# Patient Record
Sex: MALE | Race: WHITE | NOT HISPANIC OR LATINO | Employment: FULL TIME | URBAN - METROPOLITAN AREA
[De-identification: names, ages, dates, MRNs, and addresses within clinical notes are randomized per-mention and may not be internally consistent; named-entity substitution may affect disease eponyms.]

---

## 2017-01-26 ENCOUNTER — COMMUNICATION - HEALTHEAST (OUTPATIENT)
Dept: FAMILY MEDICINE | Facility: CLINIC | Age: 47
End: 2017-01-26

## 2017-04-18 ENCOUNTER — COMMUNICATION - HEALTHEAST (OUTPATIENT)
Dept: FAMILY MEDICINE | Facility: CLINIC | Age: 47
End: 2017-04-18

## 2017-05-24 ENCOUNTER — COMMUNICATION - HEALTHEAST (OUTPATIENT)
Dept: FAMILY MEDICINE | Facility: CLINIC | Age: 47
End: 2017-05-24

## 2017-05-24 DIAGNOSIS — N40.0 BPH (BENIGN PROSTATIC HYPERPLASIA): ICD-10-CM

## 2017-06-16 ENCOUNTER — RECORDS - HEALTHEAST (OUTPATIENT)
Dept: ADMINISTRATIVE | Facility: OTHER | Age: 47
End: 2017-06-16

## 2017-07-03 ENCOUNTER — OFFICE VISIT - HEALTHEAST (OUTPATIENT)
Dept: FAMILY MEDICINE | Facility: CLINIC | Age: 47
End: 2017-07-03

## 2017-07-03 DIAGNOSIS — Z48.02 VISIT FOR SUTURE REMOVAL: ICD-10-CM

## 2017-07-03 ASSESSMENT — MIFFLIN-ST. JEOR: SCORE: 1794.52

## 2017-07-10 ENCOUNTER — COMMUNICATION - HEALTHEAST (OUTPATIENT)
Dept: FAMILY MEDICINE | Facility: CLINIC | Age: 47
End: 2017-07-10

## 2017-07-10 DIAGNOSIS — G47.00 INSOMNIA: ICD-10-CM

## 2017-08-26 ENCOUNTER — COMMUNICATION - HEALTHEAST (OUTPATIENT)
Dept: FAMILY MEDICINE | Facility: CLINIC | Age: 47
End: 2017-08-26

## 2017-08-26 DIAGNOSIS — N40.0 BPH (BENIGN PROSTATIC HYPERPLASIA): ICD-10-CM

## 2017-09-28 ENCOUNTER — OFFICE VISIT - HEALTHEAST (OUTPATIENT)
Dept: FAMILY MEDICINE | Facility: CLINIC | Age: 47
End: 2017-09-28

## 2017-09-28 DIAGNOSIS — Z00.00 ROUTINE GENERAL MEDICAL EXAMINATION AT A HEALTH CARE FACILITY: ICD-10-CM

## 2017-09-28 DIAGNOSIS — N40.0 BPH (BENIGN PROSTATIC HYPERPLASIA): ICD-10-CM

## 2017-09-28 DIAGNOSIS — G47.00 INSOMNIA: ICD-10-CM

## 2017-09-28 DIAGNOSIS — Z00.00 PREVENTATIVE HEALTH CARE: ICD-10-CM

## 2017-09-28 LAB
CHOLEST SERPL-MCNC: 287 MG/DL
FASTING STATUS PATIENT QL REPORTED: YES
HDLC SERPL-MCNC: 54 MG/DL
LDLC SERPL CALC-MCNC: 197 MG/DL
TRIGL SERPL-MCNC: 178 MG/DL

## 2017-09-28 ASSESSMENT — MIFFLIN-ST. JEOR: SCORE: 1794.52

## 2017-12-17 ENCOUNTER — COMMUNICATION - HEALTHEAST (OUTPATIENT)
Dept: FAMILY MEDICINE | Facility: CLINIC | Age: 47
End: 2017-12-17

## 2017-12-28 ENCOUNTER — COMMUNICATION - HEALTHEAST (OUTPATIENT)
Dept: FAMILY MEDICINE | Facility: CLINIC | Age: 47
End: 2017-12-28

## 2017-12-28 DIAGNOSIS — G47.00 INSOMNIA: ICD-10-CM

## 2018-12-10 ENCOUNTER — COMMUNICATION - HEALTHEAST (OUTPATIENT)
Dept: FAMILY MEDICINE | Facility: CLINIC | Age: 48
End: 2018-12-10

## 2018-12-12 ENCOUNTER — COMMUNICATION - HEALTHEAST (OUTPATIENT)
Dept: FAMILY MEDICINE | Facility: CLINIC | Age: 48
End: 2018-12-12

## 2018-12-12 DIAGNOSIS — G47.00 INSOMNIA: ICD-10-CM

## 2019-08-28 ENCOUNTER — OFFICE VISIT - HEALTHEAST (OUTPATIENT)
Dept: FAMILY MEDICINE | Facility: CLINIC | Age: 49
End: 2019-08-28

## 2019-08-28 ENCOUNTER — COMMUNICATION - HEALTHEAST (OUTPATIENT)
Dept: FAMILY MEDICINE | Facility: CLINIC | Age: 49
End: 2019-08-28

## 2019-08-28 DIAGNOSIS — G47.00 INSOMNIA: ICD-10-CM

## 2019-08-28 DIAGNOSIS — E78.00 HYPERCHOLESTEREMIA: ICD-10-CM

## 2019-08-28 DIAGNOSIS — N40.0 BPH (BENIGN PROSTATIC HYPERPLASIA): ICD-10-CM

## 2019-08-28 DIAGNOSIS — F51.01 PRIMARY INSOMNIA: ICD-10-CM

## 2019-08-28 DIAGNOSIS — Z00.00 ROUTINE GENERAL MEDICAL EXAMINATION AT A HEALTH CARE FACILITY: ICD-10-CM

## 2019-08-28 DIAGNOSIS — N40.1 BENIGN PROSTATIC HYPERPLASIA WITH LOWER URINARY TRACT SYMPTOMS, SYMPTOM DETAILS UNSPECIFIED: ICD-10-CM

## 2019-08-28 LAB
ANION GAP SERPL CALCULATED.3IONS-SCNC: 9 MMOL/L (ref 5–18)
BUN SERPL-MCNC: 11 MG/DL (ref 8–22)
CALCIUM SERPL-MCNC: 9.7 MG/DL (ref 8.5–10.5)
CHLORIDE BLD-SCNC: 104 MMOL/L (ref 98–107)
CHOLEST SERPL-MCNC: 240 MG/DL
CO2 SERPL-SCNC: 26 MMOL/L (ref 22–31)
CREAT SERPL-MCNC: 1.02 MG/DL (ref 0.7–1.3)
FASTING STATUS PATIENT QL REPORTED: YES
GFR SERPL CREATININE-BSD FRML MDRD: >60 ML/MIN/1.73M2
GLUCOSE BLD-MCNC: 93 MG/DL (ref 70–125)
HDLC SERPL-MCNC: 46 MG/DL
LDLC SERPL CALC-MCNC: 147 MG/DL
POTASSIUM BLD-SCNC: 4.7 MMOL/L (ref 3.5–5)
SODIUM SERPL-SCNC: 139 MMOL/L (ref 136–145)
TRIGL SERPL-MCNC: 237 MG/DL

## 2019-08-28 RX ORDER — TAMSULOSIN HYDROCHLORIDE 0.4 MG/1
CAPSULE ORAL
Qty: 90 CAPSULE | Refills: 2 | Status: SHIPPED | OUTPATIENT
Start: 2019-08-28 | End: 2024-05-16

## 2019-08-28 ASSESSMENT — MIFFLIN-ST. JEOR: SCORE: 1768.73

## 2019-08-29 ENCOUNTER — COMMUNICATION - HEALTHEAST (OUTPATIENT)
Dept: FAMILY MEDICINE | Facility: CLINIC | Age: 49
End: 2019-08-29

## 2019-12-01 ENCOUNTER — COMMUNICATION - HEALTHEAST (OUTPATIENT)
Dept: FAMILY MEDICINE | Facility: CLINIC | Age: 49
End: 2019-12-01

## 2019-12-01 DIAGNOSIS — Z76.0 ENCOUNTER FOR MEDICATION REFILL: ICD-10-CM

## 2020-09-05 ENCOUNTER — COMMUNICATION - HEALTHEAST (OUTPATIENT)
Dept: FAMILY MEDICINE | Facility: CLINIC | Age: 50
End: 2020-09-05

## 2020-09-05 DIAGNOSIS — G47.00 INSOMNIA: ICD-10-CM

## 2020-09-08 RX ORDER — TRAZODONE HYDROCHLORIDE 100 MG/1
TABLET ORAL
Qty: 90 TABLET | Refills: 3 | Status: SHIPPED | OUTPATIENT
Start: 2020-09-08 | End: 2024-05-16

## 2020-12-17 ENCOUNTER — COMMUNICATION - HEALTHEAST (OUTPATIENT)
Dept: FAMILY MEDICINE | Facility: CLINIC | Age: 50
End: 2020-12-17

## 2020-12-17 DIAGNOSIS — Z76.0 ENCOUNTER FOR MEDICATION REFILL: ICD-10-CM

## 2020-12-18 RX ORDER — DUTASTERIDE 0.5 MG/1
0.5 CAPSULE, LIQUID FILLED ORAL DAILY
Qty: 90 CAPSULE | Refills: 3 | Status: SHIPPED | OUTPATIENT
Start: 2020-12-18 | End: 2024-05-16

## 2021-05-02 ENCOUNTER — HEALTH MAINTENANCE LETTER (OUTPATIENT)
Age: 51
End: 2021-05-02

## 2021-05-31 VITALS — HEIGHT: 72 IN | WEIGHT: 200.5 LBS | BODY MASS INDEX: 27.16 KG/M2

## 2021-05-31 VITALS — WEIGHT: 200.5 LBS | HEIGHT: 72 IN | BODY MASS INDEX: 27.16 KG/M2

## 2021-05-31 NOTE — TELEPHONE ENCOUNTER
----- Message from Anival Calix MD sent at 8/28/2019  4:39 PM CDT -----  please inform the patient that his cholesterol results show an elevated triglyceride level from last year.  It is now 237 we should recheck his triglycerides in approximately 3 months if they remain elevated I would start him on a statin.  His metabolic panel is normal please send a copy the results to his home thank you

## 2021-05-31 NOTE — TELEPHONE ENCOUNTER
Patient Returning Call  Reason for call:  Patient returning call  Information relayed to patient:                ----- Message from Anival Calix MD sent at 8/28/2019  4:39 PM CDT -----  please inform the patient that his cholesterol results show an elevated triglyceride level from last year.  It is now 237 we should recheck his triglycerides in approximately 3 months if they remain elevated I would start him on a statin.  His metabolic panel is normal please send a copy the results to his home thank you                  Patient has additional questions:  No  If YES, what are your questions/concerns:  None   Okay to leave a detailed message?: No call back needed

## 2021-06-01 ENCOUNTER — OFFICE VISIT - HEALTHEAST (OUTPATIENT)
Dept: FAMILY MEDICINE | Facility: CLINIC | Age: 51
End: 2021-06-01

## 2021-06-01 DIAGNOSIS — F51.01 PRIMARY INSOMNIA: ICD-10-CM
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
## 2021-06-01 DIAGNOSIS — S80.862A TICK BITE OF LEFT LOWER LEG, INITIAL ENCOUNTER: ICD-10-CM

## 2021-06-01 DIAGNOSIS — Z12.11 SPECIAL SCREENING FOR MALIGNANT NEOPLASMS, COLON: ICD-10-CM

## 2021-06-01 DIAGNOSIS — E78.00 HYPERCHOLESTEREMIA: ICD-10-CM

## 2021-06-01 DIAGNOSIS — Z13.220 LIPID SCREENING: ICD-10-CM

## 2021-06-01 LAB
ANION GAP SERPL CALCULATED.3IONS-SCNC: 10 MMOL/L (ref 5–18)
BUN SERPL-MCNC: 15 MG/DL (ref 8–22)
CALCIUM SERPL-MCNC: 9.6 MG/DL (ref 8.5–10.5)
CHLORIDE BLD-SCNC: 102 MMOL/L (ref 98–107)
CHOLEST SERPL-MCNC: 229 MG/DL
CO2 SERPL-SCNC: 25 MMOL/L (ref 22–31)
CREAT SERPL-MCNC: 0.95 MG/DL (ref 0.7–1.3)
FASTING STATUS PATIENT QL REPORTED: NO
GFR SERPL CREATININE-BSD FRML MDRD: >60 ML/MIN/1.73M2
GLUCOSE BLD-MCNC: 131 MG/DL (ref 70–125)
HDLC SERPL-MCNC: 48 MG/DL
LDLC SERPL CALC-MCNC: 153 MG/DL
POTASSIUM BLD-SCNC: 4.4 MMOL/L (ref 3.5–5)
SODIUM SERPL-SCNC: 137 MMOL/L (ref 136–145)
TRIGL SERPL-MCNC: 140 MG/DL

## 2021-06-01 ASSESSMENT — MIFFLIN-ST. JEOR: SCORE: 1768.16

## 2021-06-02 ENCOUNTER — COMMUNICATION - HEALTHEAST (OUTPATIENT)
Dept: ADMINISTRATIVE | Facility: CLINIC | Age: 51
End: 2021-06-02

## 2021-06-02 LAB — B BURGDOR IGG+IGM SER QL: 0.06 INDEX VALUE

## 2021-06-03 ENCOUNTER — COMMUNICATION - HEALTHEAST (OUTPATIENT)
Dept: SCHEDULING | Facility: CLINIC | Age: 51
End: 2021-06-03

## 2021-06-03 VITALS — WEIGHT: 196.56 LBS | HEIGHT: 71 IN | BODY MASS INDEX: 27.52 KG/M2

## 2021-06-03 NOTE — TELEPHONE ENCOUNTER
RN cannot approve Refill Request    RN can NOT refill this medication med is not covered by policy/route to provider     . Last office visit: Visit date not found Last Physical: Visit date not found Last MTM visit: Visit date not found Last visit same specialty: 7/3/2017 Netta Lopez MD.  Next visit within 3 mo: Visit date not found  Next physical within 3 mo: Visit date not found      Lizzie Garrido, [facility] [place] Connection Triage/Med Refill 12/1/2019    Requested Prescriptions   Pending Prescriptions Disp Refills     dutasteride (AVODART) 0.5 mg capsule [Pharmacy Med Name: DUTASTERIDE 0.5 MG CAPSULE] 90 capsule 3     Sig: TAKE 1 CAPSULE (0.5 MG TOTAL) BY MOUTH DAILY.       There is no refill protocol information for this order

## 2021-06-10 LAB — COLOGUARD-ABSTRACT: NEGATIVE

## 2021-06-11 NOTE — TELEPHONE ENCOUNTER
RN cannot approve Refill Request    RN can NOT refill this medication PCP messaged that patient is overdue for Office Visit. Last office visit: Visit date not found Last Physical: 8/28/2019 Last MTM visit: Visit date not found Last visit same specialty: 7/3/2017 Netta Lopez MD.  Next visit within 3 mo: Visit date not found  Next physical within 3 mo: Visit date not found      Isabela mSart, [facility] Triage/Med Refill 9/7/2020    Requested Prescriptions   Pending Prescriptions Disp Refills     traZODone (DESYREL) 100 MG tablet [Pharmacy Med Name: TRAZODONE 100 MG TABLET] 90 tablet 3     Sig: TAKE 1 TABLET BY MOUTH EVERYDAY AT BEDTIME       Tricyclics/Misc Antidepressant/Antianxiety Meds Refill Protocol Failed - 9/5/2020 12:18 AM        Failed - PCP or prescribing provider visit in last year     Last office visit with prescriber/PCP: Visit date not found OR same dept: Visit date not found OR same specialty: 7/3/2017 Netta Lopez MD  Last physical: 8/28/2019 Last MTM visit: Visit date not found   Next visit within 3 mo: Visit date not found  Next physical within 3 mo: Visit date not found  Prescriber OR PCP: Anival Calix MD  Last diagnosis associated with med order: 1. Insomnia  - traZODone (DESYREL) 100 MG tablet [Pharmacy Med Name: TRAZODONE 100 MG TABLET]; TAKE 1 TABLET BY MOUTH EVERYDAY AT BEDTIME  Dispense: 90 tablet; Refill: 3    If protocol passes may refill for 12 months if within 3 months of last provider visit (or a total of 15 months).

## 2021-06-11 NOTE — PROGRESS NOTES
"S:  47-year-old male who comes in today for suture removal.  He had stitches placed in his left hand after he cut himself using an instrument in his garden.  He was seen in the emergency room.  His tetanus was updated at that time.  This was 10 days ago.  He did receive sutures at [facility].  He has been doing well since.  He has been using antibacterial cream.  O:  /72 (Patient Site: Left Arm, Patient Position: Sitting, Cuff Size: Adult Regular)  Pulse 64  Temp 97.8  F (36.6  C) (Oral)   Resp 16  Ht 5' 11.5\" (1.816 m)  Wt 200 lb 8 oz (90.9 kg)  BMI 27.57 kg/m2  General he is in no acute distress alert.  Sutures were noted over the thenar eminence of his left hand.  These were removed today and Steri-Strips are placed in the usual fashion.  He tolerated the procedure well.  There were no complications.    Patient Active Problem List   Diagnosis     Tendonitis     Insomnia     Stress due to marital problems     Current Outpatient Prescriptions on File Prior to Visit   Medication Sig Dispense Refill     tamsulosin (FLOMAX) 0.4 mg Cp24 TAKE 1 CAPSULE (0.4 MG TOTAL) BY MOUTH DAILY. 90 capsule 0     traZODone (DESYREL) 100 MG tablet Take 1 tablet (100 mg total) by mouth bedtime. 90 tablet 3     melatonin 10 mg TbER Take by mouth bedtime.       temazepam (RESTORIL) 7.5 MG capsule Take 7.5 mg by mouth bedtime as needed.       No current facility-administered medications on file prior to visit.           No results found for this or any previous visit (from the past 48 hour(s)).      Assessment/Plan:  1. Visit for suture removal  He was instructed in wound care.  He will follow-up as needed.          Netta Lopez   7/3/2017 5:19 PM         "

## 2021-06-13 NOTE — PROGRESS NOTES
ASSESSMENT:  1. Preventative health care  Influenza, Seasonal Quad, Preservative Free 36+ Months    Lipid Profile    Basic Metabolic Panel   2. Insomnia sleep continues to be an issue he has been using the trazodone daily.  He gets some side effects and allows him to sleep for about 4 hours from broken sleep and he has to urinate.  We will first try to deal with his prostate issues and if that does not resolve his sleep issues would recommend using trazodone on trying another agent probable anxiolytic use warranted traZODone (DESYREL) 100 MG tablet   3. BPH (benign prostatic hyperplasia) prostate exam is unchanged from his last visit starting avodart  ,   flomax to continue tamsulosin (FLOMAX) 0.4 mg Cp24   4. Routine general medical examination at a health care facility all lab test discussed with patient.        PLAN:  There are no Patient Instructions on file for this visit.    Orders Placed This Encounter   Procedures     Influenza, Seasonal Quad, Preservative Free 36+ Months      Medications Discontinued During This Encounter   Medication Reason     melatonin 10 mg TbER Therapy completed     temazepam (RESTORIL) 7.5 MG capsule Therapy completed       No Follow-up on file.    CHIEF COMPLAINT:  Chief Complaint   Patient presents with     Annual Exam     fasting for labs       HISTORY OF PRESENT ILLNESS:  Srini is a 47 y.o. male presenting to the clinic today for a physical exam.    Insomnia: He sleeps about 6 hours of quality sleep a night. His sleep quality worsened with his job promotion. He notes that he will wake up in the middle of the night to urinate and not be able to sleep well afterwards. He is currently using 100 mg of trazodone. He often feels groggy when he wakes up.  He is not sure if he snores.    BPH: He notes that he wakes up 2-3 times a night to urinate.     Health Maintenance: He recently had a formal eye exam.     REVIEW OF SYSTEMS:   He denies shortness of breath.   All other 10 point review  "of systems are negative.    PFSH:  He has 2 children ages 8& 6. He is exercising 3 times a week. Pertinent past, family, social and medical history reviewed.   History   Smoking Status     Never Smoker   Smokeless Tobacco     Never Used       Family History   Problem Relation Age of Onset     Heart attack Father      Hyperlipidemia Mother      Hyperlipidemia Sister        Social History     Social History     Marital status: Single     Spouse name: N/A     Number of children: N/A     Years of education: N/A     Occupational History     Not on file.     Social History Main Topics     Smoking status: Never Smoker     Smokeless tobacco: Never Used     Alcohol use Yes      Comment: socially     Drug use: No     Sexual activity: No     Other Topics Concern     Not on file     Social History Narrative       No past surgical history on file.    Allergies   Allergen Reactions     Penicillins        Active Ambulatory Problems     Diagnosis Date Noted     Tendonitis      Insomnia      Stress due to marital problems 02/24/2015     Resolved Ambulatory Problems     Diagnosis Date Noted     No Resolved Ambulatory Problems     No Additional Past Medical History       VITALS:  Vitals:    09/28/17 0811   BP: 118/82   Patient Site: Right Arm   Patient Position: Sitting   Cuff Size: Adult Large   Pulse: (!) 55   Temp: 97.5  F (36.4  C)   TempSrc: Oral   SpO2: 98%   Weight: 200 lb 8 oz (90.9 kg)   Height: 5' 11.5\" (1.816 m)     Wt Readings from Last 3 Encounters:   09/28/17 200 lb 8 oz (90.9 kg)   07/03/17 200 lb 8 oz (90.9 kg)   09/15/16 190 lb 6 oz (86.4 kg)     Body mass index is 27.57 kg/(m^2).    PHYSICAL EXAM:  General: Alert, cooperative, no distress, appears stated age  Head: Normocephalic, without obvious abnormality, atraumatic  Eyes: PERRL, conjunctiva/cornea clear, EOM's intact, fundi benign, both eyes  Ears: Normal TM's and external ear canals, both ears  Nose: Nares normal, septum midline, mucosa normal, no drainage or " sinus tenderness  Throat: Lips, mucosa, and tongue normal; teeth and gums normal  Musculoskeletal: Back symmetric, no curvature, ROM normal, no CVA tenderness.  Lungs: Clear to auscultation bilaterally, respirations unlabored  Chest wall: No tenderness or deformity  Heart: Regular rate and rhythm, S1 and S2 normal, no murmur, rub, or gallop  CVS: No edema noted.   Abdomen: Soft, non tender, bowel sounds active all four quadrants, no masses, no organomegaly.  : (Male) no penile lesions or discharge, no testicular masses or tenderness, no hernias  Rectal: Mildly enlarged prostate size of apricot   Neurologic: No tremor, no focal findings.   DTRs are normal and symmetric both proximally and distally BUE and BLE.  Strength testing is normal and symetric both proximally and distally BUE and BLE.  Toes downgoing bilaterally.  Normal gait.   Psych: Oriented x3. Affect normal.     ADDITIONAL HISTORY SUMMARIZED (2): Reviewed Dr. Lopez's note from 7/3/2017 regarding suture removal.   DECISION TO OBTAIN EXTRA INFORMATION (1): None.   RADIOLOGY TESTS (1): None.  LABS (1): Labs ordered.   MEDICINE TESTS (1): None.  INDEPENDENT REVIEW (2 each): None.     The visit lasted a total of 25 minutes face to face with the patient. Over 50% of the time was spent counseling and educating the patient about physical exam.     IKirit, am scribing for and in the presence of, Dr. Calix.    Iksenia, personally performed the services described in this documentation, as scribed by Kirit Tracey in my presence, and it is both accurate and complete.    MEDICATIONS:  Current Outpatient Prescriptions   Medication Sig Dispense Refill     tamsulosin (FLOMAX) 0.4 mg Cp24 TAKE 1 CAPSULE (0.4 MG TOTAL) BY MOUTH DAILY. 90 capsule 2     traZODone (DESYREL) 100 MG tablet Take 1 tablet (100 mg total) by mouth at bedtime. Please make an appointment with MD before more refills needed. 90 tablet 0     No current facility-administered  medications for this visit.        Total data points:3

## 2021-06-13 NOTE — TELEPHONE ENCOUNTER
RN cannot approve Refill Request    RN can NOT refill this medication med is not covered by policy/route to provider. Last office visit: Visit date not found Last Physical: Visit date not found Last MTM visit: Visit date not found Last visit same specialty: 7/3/2017 Netta Lopez MD.  Next visit within 3 mo: Visit date not found  Next physical within 3 mo: Visit date not found      Deb Corcoran, [facility] Triage/Med Refill 12/17/2020    Requested Prescriptions   Pending Prescriptions Disp Refills     dutasteride (AVODART) 0.5 mg capsule [Pharmacy Med Name: DUTASTERIDE 0.5 MG CAPSULE] 90 capsule 3     Sig: TAKE 1 CAPSULE (0.5 MG TOTAL) BY MOUTH DAILY.       There is no refill protocol information for this order

## 2021-06-16 PROBLEM — E78.00 HYPERCHOLESTEREMIA: Status: ACTIVE | Noted: 2019-08-28

## 2021-06-16 PROBLEM — N40.1 BENIGN PROSTATIC HYPERPLASIA WITH LOWER URINARY TRACT SYMPTOMS: Status: ACTIVE | Noted: 2019-08-28

## 2021-06-25 NOTE — TELEPHONE ENCOUNTER
Reason for Call:  Request for results:    Name of test or procedure: All labs    Date of test of procedure: 6/1/2021    Location of the test or procedure: [facility]    OK to leave the result message on voice mail or with a family member? Yes    Phone number Patient can be reached at: Cell number on file:    Telephone Information:   Mobile [phone]       Additional comments: Pt received results via [employer] and needs interpretation of results. Please review and contact pt.     Call taken on 6/2/2021 at 2:48 PM by Ted Bhatia

## 2021-06-25 NOTE — PROGRESS NOTES
ASSESSMENT:  1. Tick bite of left lower leg, initial encounter  Presumptive treatment for isolated rash on left leg where he removed a nonengorged tick.  Side effects of medication discussed  - Lyme Antibody Cascade  - doxycycline (VIBRAMYCIN) 100 MG capsule; Take 1 capsule (100 mg total) by mouth 2 (two) times a day for 1 day.  Dispense: 2 capsule; Refill: 0  - Basic Metabolic Panel    2. Special screening for malignant neoplasms, colon    Patient agrees for Cologuard test.  - Cologuard    3. Primary insomnia    Takes trazodone as needed taking one fourth of a tablet no refills needed at this time  - Basic Metabolic Panel    4. Hypercholesteremia    Previous lipid results discussed will check lipid panel today    5. Lipid screening      - Lipid Cascade RANDOM    There are no Patient Instructions on file for this visit.    Orders Placed This Encounter   Procedures     Lyme Antibody [place]     Cologuard     Lipid Cascade RANDOM     Order Specific Question:   Fasting is required?     Answer:   No     Basic Metabolic Panel     There are no discontinued medications.    No follow-ups on file.    CHIEF COMPLAINT:  Chief Complaint   Patient presents with     tick bite     left inner thigh x 2 weeks ago       HISTORY OF PRESENT ILLNESS:  Srini is a 51 y.o. male who is here because he removed a tick from his left leg about 2 weeks ago.  He noted no pain when he removed it and it was not engorged his skin yesterday noticed a large bull's-eye-like rash on his left lower leg that was very itchy and tender.  He notes no recent fatigue he has been active.  He states he has been sleeping well.  Stress levels are low.  He is wondering if he needs to be checked or tested for Lyme's and be treated.    REVIEW OF SYSTEMS:     12 point review of  All other systems are negative.    PFSH:    Social history reviewed    TOBACCO USE:  Social History     Tobacco Use   Smoking Status Never Smoker   Smokeless Tobacco Never Used  "      VITALS:  Vitals:    06/01/21 0908   BP: 126/78   Pulse: 64   Resp: 18   Temp: 98.6  F (37  C)   TempSrc: Oral   Weight: 196 lb 7 oz (89.1 kg)   Height: 5' 11\" (1.803 m)     Wt Readings from Last 3 Encounters:   06/01/21 196 lb 7 oz (89.1 kg)   08/28/19 196 lb 9 oz (89.2 kg)   09/28/17 200 lb 8 oz (90.9 kg)       PHYSICAL EXAM:    Interactive well-built male sitting comfortably exam room no acute distress  HEENT neck supple mucous members moist no lymph enlargement and the neck  Respiratory system clear to auscultation good breath sounds no wheeze no crackles  CVS regular rate and rhythm no murmurs rubs gallops appreciated  Abdomen soft there is no focal tenderness bowel sounds are present  Skin well demarcated flat circular rash present on left thigh measuring approximately 4 x 3 cm in size it is nontender.  There is no localized areas of identification or scratch    DATA REVIEWED:  Additional History from Old Records Summarized (2): 0  Decision to Obtain Records (1): 0  Radiology Tests Summarized or Ordered (1): 0  Labs Reviewed or Ordered (1): 0  Medicine Test Summarized or Ordered (1): 0  Independent Review of EKG or X-RAY(2 each): 0    The visit lasted a total of 30 minutes .    MEDICATIONS:  Current Outpatient Medications   Medication Sig Dispense Refill     traZODone (DESYREL) 100 MG tablet TAKE 1 TABLET BY MOUTH EVERYDAY AT BEDTIME 90 tablet 3     doxycycline (VIBRAMYCIN) 100 MG capsule Take 1 capsule (100 mg total) by mouth 2 (two) times a day for 1 day. 2 capsule 0     dutasteride (AVODART) 0.5 mg capsule TAKE 1 CAPSULE (0.5 MG TOTAL) BY MOUTH DAILY. 90 capsule 3     tamsulosin (FLOMAX) 0.4 mg cap TAKE 1 CAPSULE (0.4 MG TOTAL) BY MOUTH DAILY. 90 capsule 2     No current facility-administered medications for this visit.        "

## 2021-06-25 NOTE — TELEPHONE ENCOUNTER
Went in to [facility] for he thought was a tick bite.    Rash was around tick bite.( Large rash)  He was given Lymes disease test, and   Also given 1 dose of Doxycycline.    Patient states since the test it seems to be getting better, but still itches and is still big.    Patient states rash is 12 inches long , and 4 inches wide.  It is the size of an adult palm.of hand.    He is asking for the MD who saw him to call him and let him know what it could be, and he wonders if he needs antibiotics, or   Some other medication for this.    He was advised to take benadryl.    Please advised   [phone]  Swetha Best RN  [facility] Triage/refill nurse    Reason for Disposition    Rash is only on 1 part of the body (localized)    Protocols used: RASH - WIDESPREAD ON DRUGS-A-OH

## 2021-06-25 NOTE — TELEPHONE ENCOUNTER
Glucose is high at 131, Lyme is normal, LDL and Cholesteral are high, although Cholesteral is lower than it was.  What can be relayed to pt?  Thanks

## 2021-06-27 NOTE — PROGRESS NOTES
Progress Notes by Anival Calix MD at 8/28/2019  8:00 AM     Author: Anival Calix MD Service: -- Author Type: Physician    Filed: 8/28/2019  8:52 AM Encounter Date: 8/28/2019 Status: Signed    : Anival Calix MD (Physician)       MALE PREVENTATIVE EXAM    Assessment and Plan:       1. Routine general medical examination at a health care facility  Anticipatory guidance discussed with the patient today.  His medications will be refilled.  He is fasting we discussed his lipid panel in detail family history of hyperlipidemia is present.  - Basic Metabolic Panel  - Lipid Cascade  1. Routine general medical examination at a health care facility  Basic Metabolic Panel    Lipid Cascade   2. Hypercholesteremia rechecking cholesterol today we will recheck in 6 months numbers remain above 250.  Family history of cholesteremia is present    3. Primary insomnia refilling trazodone no side effects noted    4. Benign prostatic hyperplasia with lower urinary tract symptoms, symptom details unspecified       He is taking the Flomax still gets up once a night to urinate.      Next follow up:  No follow-ups on file.    Immunization Review  Adult Imm Review: No immunizations due today  Documented tobacco use.  Website and phone contact for [employer] given to patient in AVS. and Patient does not consume nicotine at this time    I discussed the following with the patient:   Adult Healthy Living: Importance of regular exercise  Getting adequate sleep    I have had an Advance Directives discussion with the patient.    Subjective:   Chief Complaint: Srini Berry is an 49 y.o. male here for a preventative health visit.     HPI:    Srini states that overall he has been feeling well. He has recently changed his diet to help monitor his cholesterol.He has stopped eating so many eggs, and beef. He works out three or four times a week but notes it is closer to four. He only notices shortness of breathe when working out but knows that is  normal. He does note that he still has ringing in his ears.     Sleep: He is still taking his Trazadone. He cuts the pill in half and takes one before he goes to sleep and then another when he wakes up in the night. He frequently wakes up with the urge to urinate even though he says he did not have liquids right before bed. He does not know if he snores. He has never experimented with going off his flomax.       ROS:   Denies problems swallowing  Denies sore throat  Denies shortness of breathe  Denies Chest Pain  Denies Chest pressure  Denies  Heart burn   Denies  Dysuria  Denies back pain  Denies wheezing     PFSH:  He is still working, on a the third year of a five year contract   His last eye exam was a year and a half ago   His kids are eight and ten and he spent the summer doing some traveling with them in [place]  His sister has very high cholesterol even with monitoring her diet   His dad had a history of heart diseases   His mom wears glasses but has no history of eye diseases      Healthy Habits  Are you taking a daily aspirin? No  Do you typically exercising at least 40 min, 3-4 times per week?  Yes  Do you usually eat at least 4 servings of fruit and vegetables a day, include whole grains and fiber and avoid regularly eating high fat foods? Yes  Have you had an eye exam in the past two years? Yes  Do you see a dentist twice per year? Yes  Do you have any concerns regarding sleep? YES    Safety Screen  If you own firearms, are they secured in a locked gun cabinet or with trigger locks? The patient does not own any firearms  Do you feel you are safe where you are living?: Yes (8/28/2019  7:58 AM)  Do you feel you are safe in your relationship(s)?: Yes (8/28/2019  7:58 AM)        Cancer Screening     Patient has no health maintenance due at this time          Patient Care Team:  Anival Calix MD as PCP - General        History     Reviewed By Date/Time Sections Reviewed    Hina Ballard MA 8/28/2019   "8:02 AM Tobacco    BallardHina, MA 8/28/2019  7:57 AM Tobacco            Objective:   Vital Signs:   Visit Vitals  /78 (Patient Site: Left Arm, Patient Position: Sitting, Cuff Size: Adult Large)   Pulse (!) 52   Temp 97.3  F (36.3  C) (Oral)   Resp 18   Ht 5' 11\" (1.803 m)   Wt 196 lb 9 oz (89.2 kg)   SpO2 98%   BMI 27.41 kg/m           PHYSICAL EXAM:  General: Alert, cooperative, no distress, appears stated age  Head: Normocephalic, without obvious abnormality, atraumatic  Eyes: PERRL, conjunctiva/cornea clear, EOM's intact, fundi benign, both eyes  Ears: Normal TM's and external ear canals. Minor cerumen in left ear.  Nose: Nares normal, septum midline, mucosa normal, no drainage or sinus tenderness  Throat: Lips, mucosa, and tongue normal; teeth and gums normal  Back: Symmetric, no curvature, ROM normal, no CVA tenderness  Lungs: Clear to auscultation bilaterally, respirations unlabored  Chest wall: No tenderness or deformity  Heart: Regular rate and rhythm, S1 and S2 normal, no murmur, rub, or gallop  Abdomen: Soft, non tender, bowel sounds active all four quadrants, no masses, no organomegaly.   : (Male) no penile lesions or discharge, no testicular masses or tenderness, no hernias  Neurologic:  A & O x 3.  No tremor, no focal findings.   DTRs are normal and symmetric both proximally and distally BUE and BLE.  Strength testing is normal and symetric both proximally and distally BUE and BLE.  Toes downgoing bilaterally.  Normal gait.       The 10-year ASCVD risk score (Anjana DC Jr., et al., 2013) is: 4.2%    Values used to calculate the score:      Age: 49 years      Sex: Male      Is Non- : No      Diabetic: No      Tobacco smoker: No      Systolic Blood Pressure: 118 mmHg      Is BP treated: No      HDL Cholesterol: 54 mg/dL      Total Cholesterol: 287 mg/dL         Medication List           Accurate as of 8/28/19  8:41 AM. If you have any questions, ask your nurse or doctor. "               CONTINUE taking these medications    dutasteride 0.5 mg capsule  Also known as:  AVODART  INSTRUCTIONS:  TAKE 1 CAPSULE (0.5 MG TOTAL) BY MOUTH DAILY.        tamsulosin 0.4 mg Cap  Also known as:  FLOMAX  INSTRUCTIONS:  TAKE 1 CAPSULE (0.4 MG TOTAL) BY MOUTH DAILY.        traZODone 100 MG tablet  Also known as:  DESYREL  INSTRUCTIONS:  TAKE 1 TABLET (100 MG TOTAL) BY MOUTH AT BEDTIME.               Additional Screenings Completed Today:     ADDITIONAL HISTORY SUMMARIZED (2): None.  DECISION TO OBTAIN EXTRA INFORMATION (1): None.   RADIOLOGY TESTS (1): None.  LABS (1): Labs ordered and reviewed.  MEDICINE TESTS (1): None.  INDEPENDENT REVIEW (2 each): None.       The visit lasted a total of 20 minutes face to face with the patient. Over 50% of the time was spent counseling and educating the patient about annual wellness.    IBaljinder, am scribing for and in the presence of, Dr. Sauer.    I, Dr. Anival sauer, personally performed the services described in this documentation, as scribed by Baljinder Simons in my presence, and it is both accurate and complete.    Total data points: 1

## 2021-07-06 VITALS
HEART RATE: 64 BPM | SYSTOLIC BLOOD PRESSURE: 126 MMHG | WEIGHT: 196.44 LBS | TEMPERATURE: 98.6 F | HEIGHT: 71 IN | BODY MASS INDEX: 27.5 KG/M2 | RESPIRATION RATE: 18 BRPM | DIASTOLIC BLOOD PRESSURE: 78 MMHG

## 2021-10-17 ENCOUNTER — HEALTH MAINTENANCE LETTER (OUTPATIENT)
Age: 51
End: 2021-10-17

## 2022-05-29 ENCOUNTER — HEALTH MAINTENANCE LETTER (OUTPATIENT)
Age: 52
End: 2022-05-29

## 2022-10-01 ENCOUNTER — HEALTH MAINTENANCE LETTER (OUTPATIENT)
Age: 52
End: 2022-10-01

## 2023-06-04 ENCOUNTER — HEALTH MAINTENANCE LETTER (OUTPATIENT)
Age: 53
End: 2023-06-04

## 2024-05-14 ENCOUNTER — HOSPITAL ENCOUNTER (EMERGENCY)
Facility: HOSPITAL | Age: 54
Discharge: HOME | End: 2024-05-14
Attending: EMERGENCY MEDICINE | Admitting: EMERGENCY MEDICINE
Payer: COMMERCIAL

## 2024-05-14 VITALS
DIASTOLIC BLOOD PRESSURE: 83 MMHG | BODY MASS INDEX: 28.14 KG/M2 | HEART RATE: 55 BPM | WEIGHT: 201 LBS | SYSTOLIC BLOOD PRESSURE: 148 MMHG | OXYGEN SATURATION: 97 % | RESPIRATION RATE: 20 BRPM | TEMPERATURE: 98.6 F | HEIGHT: 71 IN

## 2024-05-14 DIAGNOSIS — S46.209A INJURY OF TENDON OF BICEPS: Primary | ICD-10-CM

## 2024-05-14 PROCEDURE — 99282 EMERGENCY DEPT VISIT SF MDM: CPT

## 2024-05-14 ASSESSMENT — COLUMBIA-SUICIDE SEVERITY RATING SCALE - C-SSRS
1. IN THE PAST MONTH, HAVE YOU WISHED YOU WERE DEAD OR WISHED YOU COULD GO TO SLEEP AND NOT WAKE UP?: NO
6. HAVE YOU EVER DONE ANYTHING, STARTED TO DO ANYTHING, OR PREPARED TO DO ANYTHING TO END YOUR LIFE?: NO
2. HAVE YOU ACTUALLY HAD ANY THOUGHTS OF KILLING YOURSELF IN THE PAST MONTH?: NO

## 2024-05-14 NOTE — DISCHARGE INSTRUCTIONS
You were seen in the emergency department for an arm injury.  It does seem that you have injured your right distal biceps tendon.  You were provided with a sling here.  He can wear this when you are up and moving around to help keep you comfortable.     I provided you with the contact information for [facility].  You can call them today to schedule follow-up for repeat evaluation. Tell them that you were seen in the emergency department and have a distal biceps tendon injury.    You may take Tylenol and ibuprofen for the pain. You may take 600 mg of ibuprofen (Advil) every 6 hours and 1000 mg acetaminophen (Tylenol) every 6 hours for pain. Do not take more than 3200 mg of ibuprofen (Advil) in 24 hours. Do not take more than 4000 mg of acetaminophen (Tylenol) in 24 hours. You may take this much for 1-3 days, then only use as needed.     Ice the area of pain. 15 minutes on, 15 minutes off.

## 2024-05-14 NOTE — ED PROVIDER NOTES
Emergency Department Encounter   NAME: Srini Berry  AGE: 54 year old male  YOB: 1970  MRN: [id number]    PCP: Anival Calix  ED PROVIDER: Annita Wagner PA-C    Evaluation Date & Time:   No admission date for patient encounter.    CHIEF COMPLAINT:  Arm Pain      Impression and Plan   MDM: 54-year-old male with no pertinent past medical history presents for evaluation of right arm injury.  He was doing jujitsu when his right elbow was hyperextended.  He then had pain to the distal biceps tendon.  On arrival patient is slightly hypertensive, but otherwise vitally stable.  Afebrile.  On my exam patient is well-appearing.  He is able to flex the right bicep.  He has full shoulder and elbow range of motion.  Neurovascularly intact.  He does have point tenderness to the distal bicep tendon during bicep contraction. No palpable step off or obvious deformity of the bicep. No bony tenderness concerning for fracture. We discussed that he likely injured the distal bicep tendon. He was provided with a sling. We discussed calling orthopedics today to schedule a follow up for assessment which he is agreeable to. Reviewed OTC pain control, icing the area, and resting. We reviewed strict return precautions and patient was discharged home in stable condition.    I have staffed the patient with Dr. Garcia, ED physician, who will evaluate the patient and agrees with all aspects of today's care.          Medical Decision Making  Obtained supplemental history:Supplemental history obtained?: No  Reviewed external records: External records reviewed?: No  Care impacted by chronic illness:N/A  Care significantly affected by social determinants of health:N/A  Did you consider but not order tests?: Work up considered but not performed and documented in chart, if applicable  Did you interpret images independently?: My independent interpretation of imaging: none  Consultation discussion with other provider:Did you involve another  "provider (consultant, MH, pharmacy, etc.)?: No  Discharge. No recommendations on prescription strength medication(s). N/A.   At the conclusion of the encounter I discussed the results of all the tests and the disposition. The questions were answered. The patient or family acknowledged understanding and was agreeable with the care plan.    FINAL IMPRESSION:  No diagnosis found.      MEDICATIONS GIVEN IN THE EMERGENCY DEPARTMENT:  Medications - No data to display      NEW PRESCRIPTIONS STARTED AT TODAY'S ED VISIT:  New Prescriptions    No medications on file         HPI   Patient information was obtained from: patient   Use of Intrepreter: N/A     Srini Berry is a 54 year old male with no pertinent history who presents to the ED by car for evaluation of R arm pain. Was doing jujitsu today when his R elbow was hyperextended. No direct trauma. Since then he has pain at the distal bicep tendon when flexes his bicep. Able to range his elbow and shoulder well. No numbness or tingling.       REVIEW OF SYSTEMS:  Pertinent positive and negative symptoms per HPI.       Medical History     No past medical history on file.    No past surgical history on file.    Family History   Problem Relation Age of Onset    Coronary Artery Disease Father     Hyperlipidemia Mother     Hyperlipidemia Sister        Social History     Tobacco Use    Smoking status: Never    Smokeless tobacco: Never   Substance Use Topics    Alcohol use: Yes     Comment: Alcoholic Drinks/day: socially    Drug use: No       dutasteride (AVODART) 0.5 mg capsule  tamsulosin (FLOMAX) 0.4 mg cap  traZODone (DESYREL) 100 MG tablet          Physical Exam     First Vitals:  Patient Vitals for the past 24 hrs:   BP Temp Pulse Resp SpO2 Height Weight   05/14/24 1218 (!) 146/80 98.6  F (37  C) 55 20 96 % 1.803 m (5' 11\") 91.2 kg (201 lb)       PHYSICAL EXAM:   General Appearance:  Alert, cooperative, no distress, appears stated age  Musculoskeletal: Moving all " extremities. No gross deformities.   R arm: full elbow and shoulder ROM. Sensation intact to light touch. 2+ radial pulse. Able to flex bicep. Tenderness to distal bicep palpation only during bicep contraction. No palpable step off of the tendon. No elbow or shoulder bony tenderness to palpation.  Integument: Warm, dry, no rashes or lesions  Neurologic: Alert and orientated x3. No focal deficits.  Psych: Normal mood and affect      Results     LAB:  All pertinent labs reviewed and interpreted  Labs Ordered and Resulted from Time of ED Arrival to Time of ED Departure - No data to display    RADIOLOGY:  No orders to display         Annita Wagner PA-C   Emergency Medicine   [facility] EMERGENCY DEPARTMENT       Annita Wagner PA-C  05/14/24 [id number]

## 2024-05-14 NOTE — ED PROVIDER NOTES
I, Chrissy Garcia have reviewed the documentation, personally taken the patient's history, performed an exam and agree with the physical finds, diagnosis and management plan.    HPI:  53 y/o hyperexteded RUE at elbow today while doing jiujutsu. C/o pain along bicep. Hasn't taken anything for pain.      Physical Exam: No pain with range of motion at the shoulder, elbow, wrist.  Patient has point tenderness to palpation along the distal portion of the biceps as it inserts by the elbow and there does seem to be some slight asymmetry right versus left with some less fullness to the bicep distally.  However I do not feel any petra cut off and he is totally intact with 5 out of 5 strength.    MDM: Suspect a partial biceps tendon rupture.  Certainly there is not a complete rupture as he is intact.  With pain-free range of motion, does not warrant any imaging for radial head fracture or other associated fracture    ED Course/workup: Given given sling for comfort and outpatient Ortho follow-up             Final Diagnosis:     ICD-10-CM    1. Injury of tendon of biceps  S46.209A             I personally saw the patient and performed a substantive portion of the visit including all aspects of the medical decision making.  I personally made/approved the management plan and take responsibility for the patient management.     MD Jose Sanchez Zabrina N, MD  05/14/24 1305

## 2024-05-15 ENCOUNTER — TRANSFERRED RECORDS (OUTPATIENT)
Dept: HEALTH INFORMATION MANAGEMENT | Facility: CLINIC | Age: 54
End: 2024-05-15
Payer: COMMERCIAL

## 2024-05-16 ENCOUNTER — OFFICE VISIT (OUTPATIENT)
Dept: FAMILY MEDICINE | Facility: CLINIC | Age: 54
End: 2024-05-16
Payer: COMMERCIAL

## 2024-05-16 VITALS
SYSTOLIC BLOOD PRESSURE: 110 MMHG | WEIGHT: 203 LBS | HEIGHT: 71 IN | DIASTOLIC BLOOD PRESSURE: 68 MMHG | TEMPERATURE: 97.8 F | HEART RATE: 51 BPM | BODY MASS INDEX: 28.42 KG/M2 | OXYGEN SATURATION: 98 %

## 2024-05-16 DIAGNOSIS — Z12.11 SCREEN FOR COLON CANCER: ICD-10-CM

## 2024-05-16 DIAGNOSIS — E78.00 HYPERCHOLESTEREMIA: Primary | ICD-10-CM

## 2024-05-16 DIAGNOSIS — Z01.818 PREOP EXAMINATION: ICD-10-CM

## 2024-05-16 LAB
ERYTHROCYTE [DISTWIDTH] IN BLOOD BY AUTOMATED COUNT: 12.4 % (ref 10–15)
HCT VFR BLD AUTO: 42.6 % (ref 40–53)
HGB BLD-MCNC: 14.4 G/DL (ref 13.3–17.7)
MCH RBC QN AUTO: 31 PG (ref 26.5–33)
MCHC RBC AUTO-ENTMCNC: 33.8 G/DL (ref 31.5–36.5)
MCV RBC AUTO: 92 FL (ref 78–100)
PLATELET # BLD AUTO: 237 10E3/UL (ref 150–450)
RBC # BLD AUTO: 4.64 10E6/UL (ref 4.4–5.9)
WBC # BLD AUTO: 6.4 10E3/UL (ref 4–11)

## 2024-05-16 PROCEDURE — 85027 COMPLETE CBC AUTOMATED: CPT | Performed by: FAMILY MEDICINE

## 2024-05-16 PROCEDURE — 99214 OFFICE O/P EST MOD 30 MIN: CPT | Performed by: FAMILY MEDICINE

## 2024-05-16 PROCEDURE — 80048 BASIC METABOLIC PNL TOTAL CA: CPT | Performed by: FAMILY MEDICINE

## 2024-05-16 PROCEDURE — 36415 COLL VENOUS BLD VENIPUNCTURE: CPT | Performed by: FAMILY MEDICINE

## 2024-05-16 NOTE — PROGRESS NOTES
Preoperative Evaluation  [facility]  [address] [address]  Phone: [phone]  Fax: [phone]  Primary Provider: Anival Calix MD  Pre-op Performing Provider: Virgil Alva MD  May 16, 2024               5/16/2024   Surgical Information   What procedure is being done? Pre-op physical, R distal biceps injury repair   Facility or Hospital where procedure/surgery will be performed: [facility]   Who is doing the procedure / surgery? Mychal Blum M.D.   Date of surgery / procedure: 5/20/2024   Time of surgery / procedure: Morning   Where do you plan to recover after surgery? at home with family     Fax number for surgical facility: [phone]    Assessment & Plan     The proposed surgical procedure is considered INTERMEDIATE risk.    (Z01.818) Preop examination  Comment: For repair of a right distal biceps tendon injury  Plan: Basic metabolic panel, CBC with platelets             PLAN:  1.  CBC and basic metabolic profile reviewed, results normal, see below.    2.  Patient is cleared for surgery      Possible Sleep Apnea: No obvious symptoms of        Implanted Device         - No identified additional risk factors other than previously addressed    Antiplatelet or Anticoagulation Medication Instructions   - Patient is on no antiplatelet or anticoagulation medications.    Additional Medication Instructions  Patient is on no additional chronic medications    Recommendation  Approval given to proceed with proposed procedure, without further diagnostic evaluation.          Kip Milligan is a 54 year old, presenting for the following:  Pre-Op Exam          5/16/2024    10:45 AM   Additional Questions   Roomed by Jeanne DUARTE related to upcoming procedure: Patient comes in for preoperative clearance prior to repair of her right distal biceps tendon repair of note doing shirley he had a hyperextension of the right elbow sustained the injury as  above and is scheduled for surgical intervention.    Patient has had only 1 surgery when he was young he had a right inguinal repair.  The patient is otherwise healthy he is on no regular medications, he has no confirmed drug allergies he has never been a smoker he does not have a history of heart or lung disease.              5/16/2024   Pre-Op Questionnaire   Have you ever had a heart attack or stroke? No   Have you ever had surgery on your heart or blood vessels, such as a stent placement, a coronary artery bypass, or surgery on an artery in your head, neck, heart, or legs? No   Do you have chest pain with activity? No   Do you have a history of heart failure? No   Do you currently have a cold, bronchitis or symptoms of other infection? No   Do you have a cough, shortness of breath, or wheezing? No   Do you or anyone in your family have previous history of blood clots? No   Do you or does anyone in your family have a serious bleeding problem such as prolonged bleeding following surgeries or cuts? No   Have you ever had problems with anemia or been told to take iron pills? No   Have you had any abnormal blood loss such as black, tarry or bloody stools? No   Have you ever had a blood transfusion? No   Are you willing to have a blood transfusion if it is medically needed before, during, or after your surgery? Yes   Have you or any of your relatives ever had problems with anesthesia? No   Do you have sleep apnea, excessive snoring or daytime drowsiness? No   Do you have any artifical heart valves or other implanted medical devices like a pacemaker, defibrillator, or continuous glucose monitor? No   Do you have artificial joints? No   Are you allergic to latex? No     Health Care Directive  Patient does not have a Health Care Directive or Living Will: Advance Directive received and scanned. Click on Code in the patient header to view.    Preoperative Review of    reviewed - no record of controlled substances  "prescribed.        Status of Chronic Conditions:  See problem list for active medical problems.  Problems all longstanding and stable, except as noted/documented.  See ROS for pertinent symptoms related to these conditions.    Patient Active Problem List    Diagnosis Date Noted    Hypercholesteremia 08/28/2019     Priority: Medium    Benign prostatic hyperplasia with lower urinary tract symptoms 08/28/2019     Priority: Medium    Tendonitis      Priority: Medium     Created by Conversion  Replacement Utility updated for latest IMO load        Stress due to marital problems 02/24/2015     Priority: Medium    Insomnia      Priority: Medium     Created by Conversion          No past medical history on file.  No past surgical history on file.  Current Outpatient Medications   Medication Sig Dispense Refill    dutasteride (AVODART) 0.5 mg capsule [DUTASTERIDE (AVODART) 0.5 MG CAPSULE] TAKE 1 CAPSULE (0.5 MG TOTAL) BY MOUTH DAILY. 90 capsule 3    tamsulosin (FLOMAX) 0.4 mg cap [TAMSULOSIN (FLOMAX) 0.4 MG CAP] TAKE 1 CAPSULE (0.4 MG TOTAL) BY MOUTH DAILY. 90 capsule 2    traZODone (DESYREL) 100 MG tablet [TRAZODONE (DESYREL) 100 MG TABLET] TAKE 1 TABLET BY MOUTH EVERYDAY AT BEDTIME 90 tablet 3       Allergies   Allergen Reactions    Penicillins Unknown        Social History     Tobacco Use    Smoking status: Never    Smokeless tobacco: Never   Substance Use Topics    Alcohol use: Yes     Comment: Alcoholic Drinks/day: socially     Family History   Problem Relation Age of Onset    Hyperlipidemia Mother     Coronary Artery Disease Father         Drank, smoked    Hyperlipidemia Sister      History   Drug Use No             Review of Systems  Constitutional, HEENT, cardiovascular, pulmonary, GI, , musculoskeletal, neuro, skin, endocrine and psych systems are negative, except as otherwise noted.    Objective    /68   Pulse 51   Temp 97.8  F (36.6  C)   Ht 1.803 m (5' 11\")   Wt 92.1 kg (203 lb)   SpO2 98%   BMI " "28.31 kg/m     Estimated body mass index is 28.31 kg/m  as calculated from the following:    Height as of this encounter: 1.803 m (5' 11\").    Weight as of this encounter: 92.1 kg (203 lb).  Physical Exam  GENERAL: alert and no distress  EYES: Eyes grossly normal to inspection, PERRL and conjunctivae and sclerae normal  HENT: ear canals and TM's normal, nose and mouth without ulcers or lesions  NECK: no adenopathy, no asymmetry, masses, or scars  RESP: lungs clear to auscultation - no rales, rhonchi or wheezes  CV: regular rate and rhythm, normal S1 S2, no S3 or S4, no murmur, click or rub, no peripheral edema  ABDOMEN: soft, nontender, no hepatosplenomegaly, no masses and bowel sounds normal  MS: no gross musculoskeletal defects noted, no edema  SKIN: no suspicious lesions or rashes  NEURO: Normal strength and tone, mentation intact and speech normal  PSYCH: mentation appears normal, affect normal/bright    No results for input(s): \"HGB\", \"PLT\", \"INR\", \"NA\", \"POTASSIUM\", \"CR\", \"A1C\" in the last 8760 hours.     Diagnostics  Recent Results (from the past 168 hour(s))   Basic metabolic panel    Collection Time: 05/16/24 11:16 AM   Result Value Ref Range    Sodium 138 135 - 145 mmol/L    Potassium 4.7 3.4 - 5.3 mmol/L    Chloride 105 98 - 107 mmol/L    Carbon Dioxide (CO2) 23 22 - 29 mmol/L    Anion Gap 10 7 - 15 mmol/L    Urea Nitrogen 18.3 6.0 - 20.0 mg/dL    Creatinine 0.89 0.67 - 1.17 mg/dL    GFR Estimate >90 >60 mL/min/1.73m2    Calcium 9.2 8.6 - 10.0 mg/dL    Glucose 98 70 - 99 mg/dL   CBC with platelets    Collection Time: 05/16/24 11:16 AM   Result Value Ref Range    WBC Count 6.4 4.0 - 11.0 10e3/uL    RBC Count 4.64 4.40 - 5.90 10e6/uL    Hemoglobin 14.4 13.3 - 17.7 g/dL    Hematocrit 42.6 40.0 - 53.0 %    MCV 92 78 - 100 fL    MCH 31.0 26.5 - 33.0 pg    MCHC 33.8 31.5 - 36.5 g/dL    RDW 12.4 10.0 - 15.0 %    Platelet Count 237 150 - 450 10e3/uL      No EKG required for low risk surgery (cataract, skin " procedure, breast biopsy, etc).  No EKG required, no history of coronary heart disease, significant arrhythmia, peripheral arterial disease or other structural heart disease.    Revised Cardiac Risk Index (RCRI)  The patient has the following serious cardiovascular risks for perioperative complications:   - No serious cardiac risks = 0 points     RCRI Interpretation: 0 points: Class I (very low risk - 0.4% complication rate)         Signed Electronically by: Virgil Alva MD  Copy of this evaluation report is provided to requesting physician.

## 2024-05-16 NOTE — LETTER
May 17, 2024      Srini Berry  [address]  [place] [address]        Dear ,    We are writing to inform you of your test results.Kidney test tests are normal, blood counts are normal, you are not anemic.         Resulted Orders   Basic metabolic panel   Result Value Ref Range    Sodium 138 135 - 145 mmol/L      Comment:      Reference intervals for this test were updated on 09/26/2023 to more accurately reflect our healthy population. There may be differences in the flagging of prior results with similar values performed with this method. Interpretation of those prior results can be made in the context of the updated reference intervals.     Potassium 4.7 3.4 - 5.3 mmol/L    Chloride 105 98 - 107 mmol/L    Carbon Dioxide (CO2) 23 22 - 29 mmol/L    Anion Gap 10 7 - 15 mmol/L    Urea Nitrogen 18.3 6.0 - 20.0 mg/dL    Creatinine 0.89 0.67 - 1.17 mg/dL    GFR Estimate >90 >60 mL/min/1.73m2    Calcium 9.2 8.6 - 10.0 mg/dL    Glucose 98 70 - 99 mg/dL   CBC with platelets   Result Value Ref Range    WBC Count 6.4 4.0 - 11.0 10e3/uL    RBC Count 4.64 4.40 - 5.90 10e6/uL    Hemoglobin 14.4 13.3 - 17.7 g/dL    Hematocrit 42.6 40.0 - 53.0 %    MCV 92 78 - 100 fL    MCH 31.0 26.5 - 33.0 pg    MCHC 33.8 31.5 - 36.5 g/dL    RDW 12.4 10.0 - 15.0 %    Platelet Count 237 150 - 450 10e3/uL       If you have any questions or concerns, please call the clinic at the number listed above.       Sincerely,      Virgil Alva MD

## 2024-05-17 LAB
ANION GAP SERPL CALCULATED.3IONS-SCNC: 10 MMOL/L (ref 7–15)
BUN SERPL-MCNC: 18.3 MG/DL (ref 6–20)
CALCIUM SERPL-MCNC: 9.2 MG/DL (ref 8.6–10)
CHLORIDE SERPL-SCNC: 105 MMOL/L (ref 98–107)
CREAT SERPL-MCNC: 0.89 MG/DL (ref 0.67–1.17)
DEPRECATED HCO3 PLAS-SCNC: 23 MMOL/L (ref 22–29)
EGFRCR SERPLBLD CKD-EPI 2021: >90 ML/MIN/1.73M2
GLUCOSE SERPL-MCNC: 98 MG/DL (ref 70–99)
POTASSIUM SERPL-SCNC: 4.7 MMOL/L (ref 3.4–5.3)
SODIUM SERPL-SCNC: 138 MMOL/L (ref 135–145)

## 2024-05-20 ENCOUNTER — TRANSFERRED RECORDS (OUTPATIENT)
Dept: HEALTH INFORMATION MANAGEMENT | Facility: CLINIC | Age: 54
End: 2024-05-20
Payer: COMMERCIAL

## 2024-05-31 ENCOUNTER — TRANSFERRED RECORDS (OUTPATIENT)
Dept: HEALTH INFORMATION MANAGEMENT | Facility: CLINIC | Age: 54
End: 2024-05-31
Payer: COMMERCIAL

## 2024-06-28 ENCOUNTER — TRANSFERRED RECORDS (OUTPATIENT)
Dept: HEALTH INFORMATION MANAGEMENT | Facility: CLINIC | Age: 54
End: 2024-06-28
Payer: COMMERCIAL

## 2024-06-28 DIAGNOSIS — Z12.11 COLON CANCER SCREENING: ICD-10-CM

## 2024-07-12 ENCOUNTER — ORDERS ONLY (AUTO-RELEASED) (OUTPATIENT)
Dept: ADMISSION | Facility: CLINIC | Age: 54
End: 2024-07-12
Payer: COMMERCIAL

## 2024-07-12 DIAGNOSIS — Z12.11 COLON CANCER SCREENING: ICD-10-CM

## 2024-07-21 LAB — NONINV COLON CA DNA+OCC BLD SCRN STL QL: NEGATIVE

## 2024-07-27 ENCOUNTER — HEALTH MAINTENANCE LETTER (OUTPATIENT)
Age: 54
End: 2024-07-27

## 2024-08-16 ENCOUNTER — TRANSFERRED RECORDS (OUTPATIENT)
Dept: HEALTH INFORMATION MANAGEMENT | Facility: CLINIC | Age: 54
End: 2024-08-16
Payer: COMMERCIAL

## 2024-08-19 SDOH — HEALTH STABILITY: PHYSICAL HEALTH: ON AVERAGE, HOW MANY DAYS PER WEEK DO YOU ENGAGE IN MODERATE TO STRENUOUS EXERCISE (LIKE A BRISK WALK)?: 5 DAYS

## 2024-08-19 SDOH — HEALTH STABILITY: PHYSICAL HEALTH: ON AVERAGE, HOW MANY MINUTES DO YOU ENGAGE IN EXERCISE AT THIS LEVEL?: 60 MIN

## 2024-08-19 ASSESSMENT — SOCIAL DETERMINANTS OF HEALTH (SDOH): HOW OFTEN DO YOU GET TOGETHER WITH FRIENDS OR RELATIVES?: THREE TIMES A WEEK

## 2024-08-22 ENCOUNTER — OFFICE VISIT (OUTPATIENT)
Dept: FAMILY MEDICINE | Facility: CLINIC | Age: 54
End: 2024-08-22
Payer: COMMERCIAL

## 2024-08-22 ENCOUNTER — MYC MEDICAL ADVICE (OUTPATIENT)
Dept: FAMILY MEDICINE | Facility: CLINIC | Age: 54
End: 2024-08-22

## 2024-08-22 VITALS
HEART RATE: 59 BPM | OXYGEN SATURATION: 98 % | SYSTOLIC BLOOD PRESSURE: 130 MMHG | DIASTOLIC BLOOD PRESSURE: 76 MMHG | RESPIRATION RATE: 16 BRPM | WEIGHT: 199 LBS | BODY MASS INDEX: 27.86 KG/M2 | HEIGHT: 71 IN | TEMPERATURE: 98 F

## 2024-08-22 DIAGNOSIS — E78.00 HYPERCHOLESTEREMIA: ICD-10-CM

## 2024-08-22 DIAGNOSIS — Z13.1 SCREENING FOR DIABETES MELLITUS: ICD-10-CM

## 2024-08-22 DIAGNOSIS — S76.211A INGUINAL STRAIN, RIGHT, INITIAL ENCOUNTER: ICD-10-CM

## 2024-08-22 DIAGNOSIS — R35.1 BENIGN PROSTATIC HYPERPLASIA WITH NOCTURIA: ICD-10-CM

## 2024-08-22 DIAGNOSIS — R22.2 SUBCUTANEOUS NODULE OF ABDOMINAL WALL: ICD-10-CM

## 2024-08-22 DIAGNOSIS — N40.1 BENIGN PROSTATIC HYPERPLASIA WITH NOCTURIA: ICD-10-CM

## 2024-08-22 DIAGNOSIS — Z00.00 ROUTINE GENERAL MEDICAL EXAMINATION AT A HEALTH CARE FACILITY: Primary | ICD-10-CM

## 2024-08-22 DIAGNOSIS — Z11.59 NEED FOR HEPATITIS B SCREENING TEST: ICD-10-CM

## 2024-08-22 LAB
CHOLEST SERPL-MCNC: 221 MG/DL
FASTING STATUS PATIENT QL REPORTED: YES
FASTING STATUS PATIENT QL REPORTED: YES
GLUCOSE SERPL-MCNC: 95 MG/DL (ref 70–99)
HBV CORE AB SERPL QL IA: NONREACTIVE
HBV SURFACE AB SERPL IA-ACNC: >1000 M[IU]/ML
HBV SURFACE AB SERPL IA-ACNC: REACTIVE M[IU]/ML
HBV SURFACE AG SERPL QL IA: NONREACTIVE
HDLC SERPL-MCNC: 47 MG/DL
LDLC SERPL CALC-MCNC: 151 MG/DL
NONHDLC SERPL-MCNC: 174 MG/DL
TRIGL SERPL-MCNC: 116 MG/DL

## 2024-08-22 PROCEDURE — 99396 PREV VISIT EST AGE 40-64: CPT | Mod: 25 | Performed by: FAMILY MEDICINE

## 2024-08-22 PROCEDURE — 87340 HEPATITIS B SURFACE AG IA: CPT | Performed by: FAMILY MEDICINE

## 2024-08-22 PROCEDURE — 82947 ASSAY GLUCOSE BLOOD QUANT: CPT | Performed by: FAMILY MEDICINE

## 2024-08-22 PROCEDURE — 99213 OFFICE O/P EST LOW 20 MIN: CPT | Mod: 25 | Performed by: FAMILY MEDICINE

## 2024-08-22 PROCEDURE — 80061 LIPID PANEL: CPT | Performed by: FAMILY MEDICINE

## 2024-08-22 PROCEDURE — 90750 HZV VACC RECOMBINANT IM: CPT | Performed by: FAMILY MEDICINE

## 2024-08-22 PROCEDURE — 90471 IMMUNIZATION ADMIN: CPT | Performed by: FAMILY MEDICINE

## 2024-08-22 PROCEDURE — 86706 HEP B SURFACE ANTIBODY: CPT | Performed by: FAMILY MEDICINE

## 2024-08-22 PROCEDURE — 36415 COLL VENOUS BLD VENIPUNCTURE: CPT | Performed by: FAMILY MEDICINE

## 2024-08-22 PROCEDURE — 86704 HEP B CORE ANTIBODY TOTAL: CPT | Performed by: FAMILY MEDICINE

## 2024-08-22 NOTE — PROGRESS NOTES
"Preventive Care Visit  [facility] JULIANNESHELBIE Zabala MD, Family Medicine  Aug 22, 2024      Assessment & Plan     Routine general medical examination at a health care facility  Labs, screenings, and vaccines as ordered and counseling as detailed below.  - ZOSTER RECOMBINANT ADJUVANTED (SHINGRIX)    Hypercholesteremia  Due for repeat testing, not on meds.  - Lipid panel reflex to direct LDL Non-fasting; Future  - Lipid panel reflex to direct LDL Non-fasting    Screening for diabetes mellitus  - Glucose; Future  - Glucose    Need for hepatitis B screening test  He is not sure if he has received hep B vaccine series.  - Hepatitis B Surface Antibody; Future  - Hepatitis B surface antigen; Future  - Hepatitis B core antibody; Future  - Hepatitis B Surface Antibody  - Hepatitis B surface antigen  - Hepatitis B core antibody    Benign prostatic hyperplasia with nocturia  Prostate is enlarged on exam.  He endorses waking up 1-2 times a night.  AUA symptom score is 7.  He did not find much help with tamsulosin 0.4 mg in the past.  He also had some dizziness with this medication.  He feels the symptoms are tolerable right now.  He will contact us if they become more bothersome and he wishes to restart medication.    Subcutaneous nodule of abdominal wall  Likely a lipoma or cyst.  Reassured of benign nature.  Continue to monitor.    Inguinal strain, right, initial encounter  He had a groin strain last year, concerned he could have a groin hernia. None appreciated on exam.    Patient has been advised of split billing requirements and indicates understanding: Yes        BMI  Estimated body mass index is 27.89 kg/m  as calculated from the following:    Height as of this encounter: 1.799 m (5' 10.83\").    Weight as of this encounter: 90.3 kg (199 lb).   Weight management plan: Discussed healthy diet and exercise guidelines    Counseling  Appropriate preventive services were addressed with this " "patient via screening, questionnaire, or discussion as appropriate for fall prevention, nutrition, physical activity, Tobacco-use cessation, social engagement, weight loss and cognition.  Checklist reviewing preventive services available has been given to the patient.  Reviewed patient's diet, addressing concerns and/or questions.   The patient was instructed to see the dentist every 6 months.       Follow up for annual preventive in one year.    Kip Milligan is a 54 year old, presenting for the following:  Physical        8/22/2024     7:17 AM   Additional Questions   Roomed by TONI BEST        Health Care Directive  Patient does not have a Health Care Directive or Living Will: not discussed    HPI    Some \"nodules\" on his stomach - remembers a long time ago he was told it was a cyst    Injury about 1y ago, slipped on some ice, pulled a muscle in right groin, concerned it could be a hernia, healed now, doesn't feel anything, but would like to take a look; no pain or bulge    First time back since COVID    Urinating  - attributed to BPH, tried flomax, it didn't really help  - has stopped drinking water 4h prior to sleep    Son and daughter ages 15 and 13  , in long term relationship    Works at [employer], teaches business ethics; studied philosopy            8/19/2024   General Health   How would you rate your overall physical health? Excellent   Feel stress (tense, anxious, or unable to sleep) To some extent      (!) STRESS CONCERN      8/19/2024   Nutrition   Three or more servings of calcium each day? Yes   Diet: Regular (no restrictions)   How many servings of fruit and vegetables per day? (!) 2-3   How many sweetened beverages each day? 0-1            8/19/2024   Exercise   Days per week of moderate/strenous exercise 5 days   Average minutes spent exercising at this level 60 min            8/19/2024   Social Factors   Frequency of gathering with friends or relatives Three times a week   Worry food " won't last until get money to buy more No   Food not last or not have enough money for food? No   Do you have housing? (Housing is defined as stable permanent housing and does not include staying ouside in a car, in a tent, in an abandoned building, in an overnight shelter, or couch-surfing.) Yes   Are you worried about losing your housing? No   Lack of transportation? No   Unable to get utilities (heat,electricity)? No            8/19/2024   Fall Risk   Fallen 2 or more times in the past year? No   Trouble with walking or balance? No             8/19/2024   Dental   Dentist two times every year? (!) NO            8/19/2024   TB Screening   Were you born outside of the [place]? No            Today's PHQ-2 Score:       8/22/2024     7:13 AM   PHQ-2 ( 1999 [employer])   Q1: Little interest or pleasure in doing things 0   Q2: Feeling down, depressed or hopeless 0   PHQ-2 Score 0   Q1: Little interest or pleasure in doing things Not at all   Q2: Feeling down, depressed or hopeless Not at all   PHQ-2 Score 0           8/19/2024   Substance Use   Alcohol more than 3/day or more than 7/wk No   Do you use any other substances recreationally? No        Social History     Tobacco Use    Smoking status: Never     Passive exposure: Never    Smokeless tobacco: Never   Vaping Use    Vaping status: Never Used   Substance Use Topics    Alcohol use: Yes     Comment: Alcoholic Drinks/day: socially    Drug use: No           8/19/2024   STI Screening   New sexual partner(s) since last STI/HIV test? No      ASCVD Risk   The 10-year ASCVD risk score (Jesse NICKERSON, et al., 2019) is: 6.4%    Values used to calculate the score:      Age: 54 years      Sex: Male      Is Non- : No      Diabetic: No      Tobacco smoker: No      Systolic Blood Pressure: 130 mmHg      Is BP treated: No      HDL Cholesterol: 48 mg/dL      Total Cholesterol: 229 mg/dL           Reviewed and updated as needed this visit by Provider   Shala   "Allergies  Meds  Problems  Med Hx  Surg Hx  Fam Hx  Soc   Hx Sexual Activity                Review of Systems  Constitutional, HEENT, cardiovascular, pulmonary, gi and gu systems are negative, except as otherwise noted.     Objective    Exam  /76   Pulse 59   Temp 98  F (36.7  C) (Oral)   Resp 16   Ht 1.799 m (5' 10.83\")   Wt 90.3 kg (199 lb)   SpO2 98%   BMI 27.89 kg/m     Estimated body mass index is 27.89 kg/m  as calculated from the following:    Height as of this encounter: 1.799 m (5' 10.83\").    Weight as of this encounter: 90.3 kg (199 lb).  Wt Readings from Last 3 Encounters:   08/22/24 90.3 kg (199 lb)   05/16/24 92.1 kg (203 lb)   05/14/24 91.2 kg (201 lb)       Physical Exam  GENERAL: alert and no distress  EYES: Eyes grossly normal to inspection, PERRL and conjunctivae and sclerae normal  HENT: ear canals and TM's normal, nose and mouth without ulcers or lesions  NECK: no adenopathy, no asymmetry, masses, or scars  RESP: lungs clear to auscultation - no rales, rhonchi or wheezes  CV: regular rate and rhythm, normal S1 S2, no S3 or S4, no murmur, click or rub, no peripheral edema  ABDOMEN: soft, nontender, without hepatosplenomegaly or masses, bowel sounds normal, and no hernias in groin, 1cm mobile nontender nodule of right upper abdomen just below rib cage  RECTAL: normal sphincter tone, no rectal masses and prostate enlarged, nontender, no nodules  MS: no gross musculoskeletal defects noted, no edema  SKIN: no suspicious lesions or rashes  NEURO: Normal strength and tone, mentation intact and speech normal  PSYCH: mentation appears normal, affect normal/bright        Signed Electronically by: Laurie Zabala MD    "

## 2024-08-22 NOTE — PATIENT INSTRUCTIONS
Patient Education   Preventive Care Advice   This is general advice given by our system to help you stay healthy. However, your care team may have specific advice just for you. Please talk to your care team about your preventive care needs.  Nutrition  Eat 5 or more servings of fruits and vegetables each day.  Try wheat bread, brown rice and whole grain pasta (instead of white bread, rice, and pasta).  Get enough calcium and vitamin D. Check the label on foods and aim for 100% of the RDA (recommended daily allowance).  Lifestyle  Exercise at least 150 minutes each week  (30 minutes a day, 5 days a week).  Do muscle strengthening activities 2 days a week. These help control your weight and prevent disease.  No smoking.  Wear sunscreen to prevent skin cancer.  Have a dental exam and cleaning every 6 months.  Yearly exams  See your health care team every year to talk about:  Any changes in your health.  Any medicines your care team has prescribed.  Preventive care, family planning, and ways to prevent chronic diseases.  Shots (vaccines)   HPV shots (up to age 26), if you've never had them before.  Hepatitis B shots (up to age 59), if you've never had them before.  COVID-19 shot: Get this shot when it's due.  Flu shot: Get a flu shot every year.  Tetanus shot: Get a tetanus shot every 10 years.  Pneumococcal, hepatitis A, and RSV shots: Ask your care team if you need these based on your risk.  Shingles shot (for age 50 and up)  General health tests  Diabetes screening:  Starting at age 35, Get screened for diabetes at least every 3 years.  If you are younger than age 35, ask your care team if you should be screened for diabetes.  Cholesterol test: At age 39, start having a cholesterol test every 5 years, or more often if advised.  Bone density scan (DEXA): At age 50, ask your care team if you should have this scan for osteoporosis (brittle bones).  Hepatitis C: Get tested at least once in your life.  STIs (sexually  transmitted infections)  Before age 24: Ask your care team if you should be screened for STIs.  After age 24: Get screened for STIs if you're at risk. You are at risk for STIs (including HIV) if:  You are sexually active with more than one person.  You don't use condoms every time.  You or a partner was diagnosed with a sexually transmitted infection.  If you are at risk for HIV, ask about PrEP medicine to prevent HIV.  Get tested for HIV at least once in your life, whether you are at risk for HIV or not.  Cancer screening tests  Cervical cancer screening: If you have a cervix, begin getting regular cervical cancer screening tests starting at age 21.  Breast cancer scan (mammogram): If you've ever had breasts, begin having regular mammograms starting at age 40. This is a scan to check for breast cancer.  Colon cancer screening: It is important to start screening for colon cancer at age 45.  Have a colonoscopy test every 10 years (or more often if you're at risk) Or, ask your provider about stool tests like a FIT test every year or Cologuard test every 3 years.  To learn more about your testing options, visit:   .  For help making a decision, visit:   https://bit.ly/oy35311.  Prostate cancer screening test: If you have a prostate, ask your care team if a prostate cancer screening test (PSA) at age 55 is right for you.  Lung cancer screening: If you are a current or former smoker ages 50 to 80, ask your care team if ongoing lung cancer screenings are right for you.  For informational purposes only. Not to replace the advice of your health care provider. Copyright   2023 [employer]. All rights reserved. Clinically reviewed by the [facility]. [employer] 866254 - REV 01/24.

## 2024-08-23 DIAGNOSIS — E78.00 HYPERCHOLESTEREMIA: Primary | ICD-10-CM

## 2024-08-23 DIAGNOSIS — Z82.49 FAMILY HISTORY OF PREMATURE CAD: ICD-10-CM

## 2024-09-12 ENCOUNTER — MYC MEDICAL ADVICE (OUTPATIENT)
Dept: FAMILY MEDICINE | Facility: CLINIC | Age: 54
End: 2024-09-12
Payer: COMMERCIAL

## 2024-09-21 ENCOUNTER — HOSPITAL ENCOUNTER (OUTPATIENT)
Dept: CT IMAGING | Facility: CLINIC | Age: 54
Discharge: HOME | End: 2024-09-21
Attending: FAMILY MEDICINE | Admitting: FAMILY MEDICINE
Payer: COMMERCIAL

## 2024-09-21 DIAGNOSIS — Z82.49 FAMILY HISTORY OF PREMATURE CAD: ICD-10-CM

## 2024-09-21 DIAGNOSIS — E78.00 HYPERCHOLESTEREMIA: ICD-10-CM

## 2024-09-21 PROCEDURE — 75571 CT HRT W/O DYE W/CA TEST: CPT | Mod: 26 | Performed by: INTERNAL MEDICINE

## 2024-09-21 PROCEDURE — 75571 CT HRT W/O DYE W/CA TEST: CPT

## 2024-09-23 DIAGNOSIS — E78.00 HYPERCHOLESTEREMIA: Primary | ICD-10-CM

## 2024-09-23 LAB
CV CALCIUM SCORE AGATSTON LM: 0
CV CALCIUM SCORING AGATSON LAD: 11
CV CALCIUM SCORING AGATSTON CX: 0
CV CALCIUM SCORING AGATSTON RCA: 0
CV CALCIUM SCORING AGATSTON TOTAL: 11

## 2024-09-23 RX ORDER — ATORVASTATIN CALCIUM 10 MG/1
10 TABLET, FILM COATED ORAL DAILY
Qty: 90 TABLET | Refills: 3 | Status: SHIPPED | OUTPATIENT
Start: 2024-09-23

## 2024-10-24 ENCOUNTER — ALLIED HEALTH/NURSE VISIT (OUTPATIENT)
Dept: FAMILY MEDICINE | Facility: CLINIC | Age: 54
End: 2024-10-24
Payer: COMMERCIAL

## 2024-10-24 DIAGNOSIS — Z23 ENCOUNTER FOR IMMUNIZATION: Primary | ICD-10-CM

## 2024-10-24 PROCEDURE — 90750 HZV VACC RECOMBINANT IM: CPT

## 2024-10-24 PROCEDURE — 90471 IMMUNIZATION ADMIN: CPT

## 2024-10-24 PROCEDURE — 99207 PR NO CHARGE NURSE ONLY: CPT

## 2024-10-24 NOTE — PROGRESS NOTES
Prior to immunization administration, verified patients identity using patient s name and date of birth. Please see Immunization Activity for additional information.     Is the patient's temperature normal (100.5 or less)? Yes     Patient MEETS CRITERIA. PROCEED with vaccine administration.      Patient instructed to remain in clinic for 15 minutes afterwards, and to report any adverse reactions.      Link to Ancillary Visit Immunization Standing Orders SmartSet     Screening performed by Valeria Nichols MA on 10/24/2024 at 9:34 AM.        Prior to immunization administration, verified patients identity using patient s name and date of birth. Please see Immunization Activity for additional information.     Is the patient's temperature normal (100.5 or less)? Yes     Patient MEETS CRITERIA. PROCEED with vaccine administration.        10/24/2024   General Questionnaire    Do you have any questions for your care team about the vaccines you will be receiving today? none                10/24/2024   Zoster   Have you had a serious reaction to the shingles vaccine or something in the shingles vaccine? No   Do you have shingles now? No   Are you getting treatment for cancer, organ transplant, or bone marrow transplant? No   Do you have an autoimmune or inflammatory condition? No   Is the patient immunocompromised and wanting to complete the Shingles vaccine series in less than 2 months? No   Have you had Guillain-Naples syndrome within 6 weeks of getting a vaccine? No            Patient MEETS CRITERIA. PROCEED with vaccine administration.      Patient instructed to remain in clinic for 15 minutes afterwards, and to report any adverse reactions.      Link to Ancillary Visit Immunization Standing Orders SmartSet     Screening performed by Valeria Nichols MA on 10/24/2024 at 9:36 AM.

## 2024-11-07 DIAGNOSIS — N40.1 BENIGN PROSTATIC HYPERPLASIA WITH NOCTURIA: Primary | ICD-10-CM

## 2024-11-07 DIAGNOSIS — R35.1 BENIGN PROSTATIC HYPERPLASIA WITH NOCTURIA: Primary | ICD-10-CM

## 2024-11-07 RX ORDER — ALFUZOSIN HYDROCHLORIDE 10 MG/1
10 TABLET, EXTENDED RELEASE ORAL DAILY
Qty: 30 TABLET | Refills: 11 | Status: SHIPPED | OUTPATIENT
Start: 2024-11-07

## 2025-04-12 NOTE — TELEPHONE ENCOUNTER
Medication Question or Clarification  Who is calling: Patient  What medication are you calling about? (include dose and sig)   tamsulosin (FLOMAX) 0.4 mg cap 90 capsule 2 8/28/2019     Sig: TAKE 1 CAPSULE (0.4 MG TOTAL) BY MOUTH DAILY.    Who prescribed the medication?: Anival Calix MD  What is your question/concern?: Patient states he on Dutasteride 0.5 mg for a while does he need to stop this medication and start tamsulosin  0.4 mg or take both medication. Please advise.  Pharmacy: [id number]  Okay to leave a detailed message?: No  Site CMT - Please call the pharmacy to obtain any additional needed information.   Left LE/weight-bearing as tolerated

## 2025-07-23 ENCOUNTER — PATIENT OUTREACH (OUTPATIENT)
Dept: CARE COORDINATION | Facility: CLINIC | Age: 55
End: 2025-07-23
Payer: COMMERCIAL

## 2025-08-06 ENCOUNTER — PATIENT OUTREACH (OUTPATIENT)
Dept: CARE COORDINATION | Facility: CLINIC | Age: 55
End: 2025-08-06
Payer: COMMERCIAL